# Patient Record
Sex: FEMALE | Race: BLACK OR AFRICAN AMERICAN | NOT HISPANIC OR LATINO | URBAN - METROPOLITAN AREA
[De-identification: names, ages, dates, MRNs, and addresses within clinical notes are randomized per-mention and may not be internally consistent; named-entity substitution may affect disease eponyms.]

---

## 2021-04-12 ENCOUNTER — OFFICE VISIT (OUTPATIENT)
Dept: URBAN - METROPOLITAN AREA CLINIC 40 | Facility: CLINIC | Age: 81
End: 2021-04-12

## 2021-04-12 RX ORDER — DEXLANSOPRAZOLE 60 MG/1
TAKE 1 CAPSULE BY ORAL ROUTE DAILY FOR 90 DAYS CAPSULE, DELAYED RELEASE ORAL 1
Qty: 90 | Refills: 0 | Status: ACTIVE | COMMUNITY
Start: 2017-12-07

## 2021-06-11 ENCOUNTER — OFFICE VISIT (OUTPATIENT)
Dept: URBAN - METROPOLITAN AREA CLINIC 40 | Facility: CLINIC | Age: 81
End: 2021-06-11
Payer: MEDICARE

## 2021-06-11 ENCOUNTER — LAB OUTSIDE AN ENCOUNTER (OUTPATIENT)
Dept: URBAN - METROPOLITAN AREA CLINIC 40 | Facility: CLINIC | Age: 81
End: 2021-06-11

## 2021-06-11 DIAGNOSIS — R07.89 CHEST PAIN, NON-CARDIAC: ICD-10-CM

## 2021-06-11 DIAGNOSIS — K57.52 DIVERTICULITIS OF BOTH SMALL AND LARGE INTESTINE: ICD-10-CM

## 2021-06-11 DIAGNOSIS — R14.0 BLOATING: ICD-10-CM

## 2021-06-11 DIAGNOSIS — K64.1 GRADE II HEMORRHOIDS: ICD-10-CM

## 2021-06-11 PROCEDURE — 99204 OFFICE O/P NEW MOD 45 MIN: CPT | Performed by: INTERNAL MEDICINE

## 2021-06-11 PROCEDURE — 99244 OFF/OP CNSLTJ NEW/EST MOD 40: CPT | Performed by: INTERNAL MEDICINE

## 2021-06-11 RX ORDER — IRON FUM,PS CMP/VIT C/NIACIN 125-40-3MG
2 CAPSULE ORAL ONCE A DAY
Qty: 60 | Refills: 5 | OUTPATIENT
Start: 2021-06-11

## 2021-06-11 RX ORDER — METOPROLOL TARTRATE 50 MG/1
1 TABLET WITH FOOD TABLET ORAL TWICE A DAY
Status: ACTIVE | COMMUNITY

## 2021-06-11 RX ORDER — DEXLANSOPRAZOLE 60 MG/1
TAKE 1 CAPSULE BY ORAL ROUTE DAILY FOR 90 DAYS CAPSULE, DELAYED RELEASE ORAL 1
Qty: 90 | Refills: 0 | Status: DISCONTINUED | COMMUNITY
Start: 2017-12-07

## 2021-06-11 NOTE — HPI-TODAY'S VISIT:
Pt is referred by Dr. Jhon Loredo. A copy of this note will be provided for review. EGD 2017, esophageal web dilation. Colonoscopy 2015 negative for polyps. Colonoscopy 2006 negative. Prior hemorrhoid banding and CRS consult. EXAM:  CH CT ABDOMEN/PELVIS WITH IV CONTRAST(CREATININE DRAW IF NEEDED)   CLINICAL INDICATION:  Nausea/vomiting   TECHNIQUE: Following IV administration of 85 mL of Omnipaque 350, CT scan of the abdomen and pelvis with multiplanar reformatted images generated from the data set. Dose reduction technique was utilized.   COMPARISON:  No comparison studies are available at this time.   FINDINGS:    Lower chest:  No evidence of abnormality.   Liver:  Mild intrahepatic biliary ductal dilatation. Several small subcentimeter low-density lesions are noted statistically likely to represent cysts. The liver is otherwise unremarkable.   Gallbladder:  Distended    Pancreas: The common bile duct in the head of the pancreas measures approximately 5 mm. The pancreas appears unremarkable.    Spleen:  Normal.   Adrenals:  Normal.   Kidneys:  Normal.    Gastrointestinal:  The stomach appears normal. Small bowel unremarkable. Extensive divergent particularly low cyst is noted throughout the colon. In the distal sigmoid colon the region of the rectosigmoid and the left side of the pelvis there is a  dilated and inflamed appearing diverticulum with surrounding edema and/or inflammation. No free air or abscess is seen. This is felt to represent a focal area of acute diverticulitis. The appendix is normal in appearance.   Pelvis:  Status post hysterectomy. Urinary bladder appears normal.   Vasculature:  Abdominal and pelvic vasculature appears normal.   Lymph nodes:  No enlarged lymph nodes.    Bones:  No destructive osseous lesion.   IMPRESSION: .         .   Appearance compatible with focal acute diverticulitis of the distal sigmoid colon in the left side of the pelvis. No free air or abscess noted.   Distended gallbladder.   Central biliary ductal dilatation.   Released By: SHELLY STEPHENS MD 5/11/2021 8:32 AM

## 2021-06-14 ENCOUNTER — TELEPHONE ENCOUNTER (OUTPATIENT)
Dept: URBAN - METROPOLITAN AREA CLINIC 92 | Facility: CLINIC | Age: 81
End: 2021-06-14

## 2021-07-19 ENCOUNTER — TELEPHONE ENCOUNTER (OUTPATIENT)
Dept: URBAN - METROPOLITAN AREA CLINIC 74 | Facility: CLINIC | Age: 81
End: 2021-07-19

## 2021-07-23 ENCOUNTER — CLAIMS CREATED FROM THE CLAIM WINDOW (OUTPATIENT)
Dept: URBAN - METROPOLITAN AREA CLINIC 4 | Facility: CLINIC | Age: 81
End: 2021-07-23
Payer: MEDICARE

## 2021-07-23 ENCOUNTER — OFFICE VISIT (OUTPATIENT)
Dept: URBAN - METROPOLITAN AREA SURGERY CENTER 30 | Facility: SURGERY CENTER | Age: 81
End: 2021-07-23
Payer: MEDICARE

## 2021-07-23 DIAGNOSIS — K29.40 CHRONIC ATROPHIC GASTRITIS WITHOUT BLEEDING: ICD-10-CM

## 2021-07-23 DIAGNOSIS — K31.89 GASTRIC FOVEOLAR HYPERPLASIA: ICD-10-CM

## 2021-07-23 DIAGNOSIS — K29.40 ATROPHIC GASTRITIS: ICD-10-CM

## 2021-07-23 DIAGNOSIS — D50.9 ANEMIA, IRON DEFICIENCY: ICD-10-CM

## 2021-07-23 DIAGNOSIS — Z87.19 H/O ACUTE PANCREATITIS: ICD-10-CM

## 2021-07-23 PROCEDURE — G8907 PT DOC NO EVENTS ON DISCHARG: HCPCS | Performed by: INTERNAL MEDICINE

## 2021-07-23 PROCEDURE — 88342 IMHCHEM/IMCYTCHM 1ST ANTB: CPT | Performed by: PATHOLOGY

## 2021-07-23 PROCEDURE — 45378 DIAGNOSTIC COLONOSCOPY: CPT | Performed by: INTERNAL MEDICINE

## 2021-07-23 PROCEDURE — 43239 EGD BIOPSY SINGLE/MULTIPLE: CPT | Performed by: INTERNAL MEDICINE

## 2021-07-23 PROCEDURE — 88305 TISSUE EXAM BY PATHOLOGIST: CPT | Performed by: PATHOLOGY

## 2021-08-02 ENCOUNTER — OFFICE VISIT (OUTPATIENT)
Dept: URBAN - METROPOLITAN AREA CLINIC 40 | Facility: CLINIC | Age: 81
End: 2021-08-02
Payer: MEDICARE

## 2021-08-02 DIAGNOSIS — K57.92 DIVERTICULITIS: ICD-10-CM

## 2021-08-02 DIAGNOSIS — K64.1 GRADE II HEMORRHOIDS: ICD-10-CM

## 2021-08-02 DIAGNOSIS — R10.84 GENERALIZED ABDOMINAL PAIN: ICD-10-CM

## 2021-08-02 DIAGNOSIS — R14.0 BLOATING: ICD-10-CM

## 2021-08-02 DIAGNOSIS — K21.9 GERD WITHOUT ESOPHAGITIS: ICD-10-CM

## 2021-08-02 PROBLEM — 282825002: Status: ACTIVE | Noted: 2021-06-11

## 2021-08-02 PROCEDURE — 99212 OFFICE O/P EST SF 10 MIN: CPT | Performed by: INTERNAL MEDICINE

## 2021-08-02 RX ORDER — METOPROLOL TARTRATE 50 MG/1
1 TABLET WITH FOOD TABLET ORAL TWICE A DAY
Status: ACTIVE | COMMUNITY

## 2021-08-02 RX ORDER — IRON FUM,PS CMP/VIT C/NIACIN 125-40-3MG
2 CAPSULE ORAL ONCE A DAY
Qty: 60 | Refills: 5 | OUTPATIENT

## 2021-08-02 RX ORDER — IRON FUM,PS CMP/VIT C/NIACIN 125-40-3MG
2 CAPSULE ORAL ONCE A DAY
Qty: 60 | Refills: 5 | Status: ACTIVE | COMMUNITY
Start: 2021-06-11

## 2021-08-02 NOTE — HPI-TODAY'S VISIT:
Patient Returns to clinic for follow up after procedures. She was seen recently for follow-up after diverticulitis and acid reflux.  At that time she had hemorrhoids bloating and noncardiac chest pain.  Also anemia.  Last visit we started Integra iron 2 capsules daily.  Imagings showed distended gallbladder there was mention of nonspecific bile duct dilation.  Mild normocytic anemia.  EGD showed small hiatal hernia.  Bx negative, SBBx negative, No H.pylori. Colonoscopy showed diverticulosis, no polyps or AVMs.

## 2022-08-01 ENCOUNTER — OFFICE VISIT (OUTPATIENT)
Dept: URBAN - METROPOLITAN AREA CLINIC 40 | Facility: CLINIC | Age: 82
End: 2022-08-01

## 2022-08-01 RX ORDER — METOPROLOL TARTRATE 50 MG/1
1 TABLET WITH FOOD TABLET ORAL TWICE A DAY
Status: ACTIVE | COMMUNITY

## 2022-08-01 RX ORDER — IRON FUM,PS CMP/VIT C/NIACIN 125-40-3MG
2 CAPSULE ORAL ONCE A DAY
Qty: 60 | Refills: 5 | Status: ACTIVE | COMMUNITY

## 2023-01-09 ENCOUNTER — TELEPHONE ENCOUNTER (OUTPATIENT)
Dept: URBAN - METROPOLITAN AREA CLINIC 92 | Facility: CLINIC | Age: 83
End: 2023-01-09

## 2023-01-09 ENCOUNTER — WEB ENCOUNTER (OUTPATIENT)
Dept: URBAN - METROPOLITAN AREA CLINIC 40 | Facility: CLINIC | Age: 83
End: 2023-01-09

## 2023-01-09 ENCOUNTER — OFFICE VISIT (OUTPATIENT)
Dept: URBAN - METROPOLITAN AREA CLINIC 40 | Facility: CLINIC | Age: 83
End: 2023-01-09
Payer: MEDICARE

## 2023-01-09 VITALS
TEMPERATURE: 96.1 F | HEART RATE: 63 BPM | HEIGHT: 63 IN | WEIGHT: 161.2 LBS | BODY MASS INDEX: 28.56 KG/M2 | DIASTOLIC BLOOD PRESSURE: 82 MMHG | SYSTOLIC BLOOD PRESSURE: 130 MMHG

## 2023-01-09 DIAGNOSIS — K62.5 RECTAL BLEEDING: ICD-10-CM

## 2023-01-09 DIAGNOSIS — Z86.2 HISTORY OF ANEMIA: ICD-10-CM

## 2023-01-09 DIAGNOSIS — R10.32 ABDOMINAL PAIN, LLQ: ICD-10-CM

## 2023-01-09 DIAGNOSIS — Z87.19 HISTORY OF DIVERTICULITIS OF COLON: ICD-10-CM

## 2023-01-09 PROBLEM — 102614006: Status: ACTIVE | Noted: 2021-06-14

## 2023-01-09 PROBLEM — 307496006: Status: ACTIVE | Noted: 2021-06-11

## 2023-01-09 PROCEDURE — 99214 OFFICE O/P EST MOD 30 MIN: CPT | Performed by: PHYSICIAN ASSISTANT

## 2023-01-09 RX ORDER — METOPROLOL TARTRATE 50 MG/1
1 TABLET WITH FOOD TABLET ORAL TWICE A DAY
Status: ACTIVE | COMMUNITY

## 2023-01-09 RX ORDER — IRON FUM,PS CMP/VIT C/NIACIN 125-40-3MG
2 CAPSULE ORAL ONCE A DAY
Qty: 60 | Refills: 5 | Status: ACTIVE | COMMUNITY

## 2023-01-09 NOTE — HPI-TODAY'S VISIT:
Ms. Gonzalez is an 82-year old Black female who presents to office with complaint of rectal bleeding, BRBPR for the last 24 hours. Last seen by Dr. Goss 8/2/21 for follow up after procedures. She was seen recently for follow-up after diverticulitis and acid reflux.  At that time she had hemorrhoids bloating and noncardiac chest pain.  Also anemia, previously on Integra iron, 2 capsules daily. Normal CBC 11/15/22.  Past imaging showed distended gallbladder there was mention of nonspecific bile duct dilation.  EGD in 2021 showed small hiatal hernia.  Bx negative, SBBx negative, No H.pylori. Colonoscopy showed diverticulosis, no polyps or AVMs. Today, she returns to the office stating that on 8 AM on yesterday, Sunday morning, she had a normal soft bowel movement which was red and was accompanied by a large amount of bright red blood per rectum. The volume of BRB has reportedly increased since yesterday AM. She is also having lower abdominal cramping bilaterally.  She admits however that the pain is more noticeable on the left lower quadrant.  Denies any fever or chills.  Denies any change in medications, herbs or supplements.  She has been on Eliquis long-term without any other complication.  She is a non-smoker and does not drink alcohol.  No use of NSAIDs.  May use Tylenol only as needed. She is no longer on iron supplement.

## 2023-01-09 NOTE — PHYSICAL EXAM RECTAL:
(Gisele ROSALES) :  , no masses palpable , no melena, +BRB at anal canal. ROMELIA otherwise normal. Grade 3 IH at suspected LL position, nonbleeding with palpation. Erythematous anal canal. No evidence of anal fissure. Sphincter tone maintained.

## 2023-01-09 NOTE — PHYSICAL EXAM GASTROINTESTINAL
Abdomen , soft, mildly TTP at lower abdomen, nondistended , no guarding or rigidity , no masses palpable , normal bowel sounds , Liver and Spleen,  no hepatosplenomegaly , liver nontender

## 2023-01-11 ENCOUNTER — TELEPHONE ENCOUNTER (OUTPATIENT)
Dept: URBAN - METROPOLITAN AREA CLINIC 40 | Facility: CLINIC | Age: 83
End: 2023-01-11

## 2023-02-03 ENCOUNTER — OFFICE VISIT (OUTPATIENT)
Dept: URBAN - METROPOLITAN AREA CLINIC 40 | Facility: CLINIC | Age: 83
End: 2023-02-03
Payer: MEDICARE

## 2023-02-03 ENCOUNTER — WEB ENCOUNTER (OUTPATIENT)
Dept: URBAN - METROPOLITAN AREA CLINIC 40 | Facility: CLINIC | Age: 83
End: 2023-02-03

## 2023-02-03 ENCOUNTER — DASHBOARD ENCOUNTERS (OUTPATIENT)
Age: 83
End: 2023-02-03

## 2023-02-03 VITALS
SYSTOLIC BLOOD PRESSURE: 140 MMHG | HEART RATE: 78 BPM | BODY MASS INDEX: 28.95 KG/M2 | DIASTOLIC BLOOD PRESSURE: 80 MMHG | TEMPERATURE: 97.6 F | HEIGHT: 63 IN | WEIGHT: 163.4 LBS

## 2023-02-03 DIAGNOSIS — K21.9 GERD WITHOUT ESOPHAGITIS: ICD-10-CM

## 2023-02-03 DIAGNOSIS — Z87.19 HISTORY OF DIVERTICULITIS OF COLON: ICD-10-CM

## 2023-02-03 DIAGNOSIS — Z79.01 BLOOD THINNED DUE TO LONG-TERM ANTICOAGULANT USE: ICD-10-CM

## 2023-02-03 DIAGNOSIS — Z86.2 HISTORY OF ANEMIA: ICD-10-CM

## 2023-02-03 DIAGNOSIS — R10.30 LOWER ABDOMINAL PAIN: ICD-10-CM

## 2023-02-03 DIAGNOSIS — K64.1 GRADE II HEMORRHOIDS: ICD-10-CM

## 2023-02-03 DIAGNOSIS — R14.0 BLOATING: ICD-10-CM

## 2023-02-03 DIAGNOSIS — I48.20 CHRONIC ATRIAL FIBRILLATION: ICD-10-CM

## 2023-02-03 DIAGNOSIS — K62.5 RECTAL BLEEDING: ICD-10-CM

## 2023-02-03 PROBLEM — 266435005: Status: ACTIVE | Noted: 2021-08-02

## 2023-02-03 PROBLEM — 116289008: Status: ACTIVE | Noted: 2021-06-14

## 2023-02-03 PROBLEM — 275538002 HISTORY OF ANEMIA: Status: ACTIVE | Noted: 2023-01-09

## 2023-02-03 PROBLEM — 426749004: Status: ACTIVE | Noted: 2023-01-09

## 2023-02-03 PROBLEM — 711150003 LONG-TERM CURRENT USE OF ANTICOAGULANT: Status: ACTIVE | Noted: 2023-01-09

## 2023-02-03 PROCEDURE — 99213 OFFICE O/P EST LOW 20 MIN: CPT | Performed by: INTERNAL MEDICINE

## 2023-02-03 RX ORDER — IRON FUM,PS CMP/VIT C/NIACIN 125-40-3MG
2 CAPSULE ORAL ONCE A DAY
Qty: 60 | Refills: 5 | Status: ON HOLD | COMMUNITY

## 2023-02-03 RX ORDER — METOPROLOL TARTRATE 50 MG/1
1 TABLET WITH FOOD TABLET ORAL TWICE A DAY
Status: ACTIVE | COMMUNITY

## 2023-02-03 NOTE — HPI-TODAY'S VISIT:
Ms. Gonzalez is an 83-year old Black female who presented to office  a few weels ag with complaint of rectal bleeding, BRBPR for the last 24 hours. She saw [facility], negative ROMELIA. CTA was negative. Blood thinned. Hemorrhoids and diverticulosis.  -=-=- Prior note: Last seen by Dr. Goss 8/2/21 for follow up after procedures. She was seen recently for follow-up after diverticulitis and acid reflux.  At that time she had hemorrhoids bloating and noncardiac chest pain.  Also anemia, previously on Integra iron, 2 capsules daily. Normal CBC 11/15/22.  Past imaging showed distended gallbladder there was mention of nonspecific bile duct dilation.  EGD in 2021 showed small hiatal hernia.  Bx negative, SBBx negative, No H.pylori. Colonoscopy showed diverticulosis, no polyps or AVMs. Today, she returns to the office stating that on 8 AM on yesterday, Sunday morning, she had a normal soft bowel movement which was red and was accompanied by a large amount of bright red blood per rectum. The volume of BRB has reportedly increased since yesterday AM. She is also having lower abdominal cramping bilaterally.  She admits however that the pain is more noticeable on the left lower quadrant.  Denies any fever or chills.  Denies any change in medications, herbs or supplements.  She has been on Eliquis long-term without any other complication.  She is a non-smoker and does not drink alcohol.  No use of NSAIDs.  May use Tylenol only as needed. She is no longer on iron supplement. ===== ADDENDUM:   Postprocessing of the source data set was carried out in the [facility] laboratory utilizing slab MIP, MPR, and 3-D volume rendered imaging.    Agree with original report. No additional acute vascular findings. Accessory left renal artery.   Released By: JOSEPH CABRERA MD 1/12/2023 9:49 AM Addended by Joseph Cabrera MD on 1/12/2023  9:49 AM  Study Result  Narrative & Impression EXAM:   CT ANGIOGRAM ABDOMEN/PELVIS WITH IV CONTRAST(CREATININE DRAW IF NEEDED)   CLINICAL INDICATION:  K62.5 (Hemorrhage of anus and rectum)  Z87.19 (Personal history of other diseases of the digestive system)  Z79.01 (Long term (current) use of anticoagulants)  Z86.2 (Personal history of diseases of the blood and blood-forming organs and certain disorders involving the immune mechanism)  R10.30 (Lower abdominal pain, unspecified)    TECHNIQUE:  Following IV administration of 88 mL of Omnipaque, CT scan of the abdomen and pelvis with multiplanar reformatted images including MIPs generated from the data set. Dose reduction techniques were utilized. [facility] post processing is pending.   COMPARISON:  5/11/2021   Lower chest:  Right posterior Bochdalek hernia. No acute cardiopulmonary findings.   Liver:  Small hypodense lesions in the liver, favor to represent cysts.   Gallbladder:  Normal.    Pancreas: Normal.    Spleen:  Normal.   Adrenals:  Normal.   Kidneys:  Normal.    Gastrointestinal: Extensive pancolonic diverticulosis. No area of focal inflammation is identified on this exam. There is diverticular disease involving the terminal ileum. No bowel obstruction. The appendix is not identified.   Pelvis:  Uterus is not identified. Bladder is grossly unremarkable. No adnexal mass. Laxity of the linea alba.   Lymph nodes:  No enlarged lymph nodes.    Bones:  No destructive osseous lesion. Degenerative spondylosis.   VASCULAR:   Suboptimal evaluation due to single arterial phase examination. There is no evidence of arterial extravasation on this exam.   Arterial structures enhance normally, without evidence of aneurysm or dissection. No hemodynamically significant stenosis.   IMPRESSION: .         .   No evidence for active bleeding on this single arterial phase examination. If clinical symptoms persist, endoscopic evaluation, nuclear medicine GI study, or CTA GI Bleed study is recommended.   Extensive diverticular disease without focal inflammation.   Released By: JOSEPH CABRERA 1/9/2023 2:56 PM

## 2023-02-03 NOTE — PHYSICAL EXAM HENT:
Head, normocephalic, atraumatic, Face, Face within normal limits, Ears, External ears within normal limits, Nose/Nasopharynx, External nose normal appearance, nares patent, no nasal discharge, Mouth and Throat, Oral cavity appearance normal, Lips, Appearance normal , Head, normocephalic, atraumatic, Face, Face within normal limits, Ears, External ears within normal limits

## 2023-02-03 NOTE — PHYSICAL EXAM CHEST:
no lesions, no deformities, no traumatic injuries, no significant scars are present, chest wall non-tender, no masses present, breathing is unlabored without accessory muscle use,normal breath sounds , chest wall non-tender, breathing is unlabored without accessory muscle use, normal breath sounds

## 2023-02-03 NOTE — PHYSICAL EXAM EYES:
Conjunctivae and eyelids appear normal,  Sclerae : White without injection  , Conjuntivae and eyelids appear normal, Sclerae : White without injection

## 2024-10-04 ENCOUNTER — TELEPHONE ENCOUNTER (OUTPATIENT)
Dept: URBAN - METROPOLITAN AREA CLINIC 40 | Facility: CLINIC | Age: 84
End: 2024-10-04

## 2024-10-16 ENCOUNTER — OFFICE VISIT (OUTPATIENT)
Dept: URBAN - METROPOLITAN AREA CLINIC 40 | Facility: CLINIC | Age: 84
End: 2024-10-16
Payer: MEDICARE

## 2024-10-16 VITALS
BODY MASS INDEX: 28 KG/M2 | HEIGHT: 63 IN | HEART RATE: 70 BPM | WEIGHT: 158 LBS | TEMPERATURE: 97.7 F | SYSTOLIC BLOOD PRESSURE: 110 MMHG | DIASTOLIC BLOOD PRESSURE: 64 MMHG

## 2024-10-16 DIAGNOSIS — K64.1 GRADE II HEMORRHOIDS: ICD-10-CM

## 2024-10-16 DIAGNOSIS — K21.9 GERD WITHOUT ESOPHAGITIS: ICD-10-CM

## 2024-10-16 DIAGNOSIS — R14.0 BLOATING: ICD-10-CM

## 2024-10-16 DIAGNOSIS — R10.30 LOWER ABDOMINAL PAIN: ICD-10-CM

## 2024-10-16 DIAGNOSIS — K62.5 RECTAL BLEEDING: ICD-10-CM

## 2024-10-16 DIAGNOSIS — I48.20 CHRONIC ATRIAL FIBRILLATION: ICD-10-CM

## 2024-10-16 DIAGNOSIS — Z86.2 HISTORY OF ANEMIA: ICD-10-CM

## 2024-10-16 DIAGNOSIS — Z79.01 BLOOD THINNED DUE TO LONG-TERM ANTICOAGULANT USE: ICD-10-CM

## 2024-10-16 DIAGNOSIS — Z87.19 HISTORY OF DIVERTICULITIS OF COLON: ICD-10-CM

## 2024-10-16 PROCEDURE — 99213 OFFICE O/P EST LOW 20 MIN: CPT | Performed by: PHYSICIAN ASSISTANT

## 2024-10-16 RX ORDER — METOPROLOL TARTRATE 50 MG/1
1 TABLET WITH FOOD TABLET ORAL TWICE A DAY
Status: ACTIVE | COMMUNITY

## 2024-10-16 NOTE — HPI-TODAY'S VISIT:
Ms. Gonzalez is an 82-year old Black female who presents to office with complaint of rectal bleeding, BRBPR for the last 24 hours. Last seen by Dr. Goss 8/2/21 for follow up after procedures. She was seen recently for follow-up after diverticulitis and acid reflux.  At that time she had hemorrhoids bloating and noncardiac chest pain.  Also anemia, previously on Integra iron, 2 capsules daily. Normal CBC 11/15/22.  Past imaging showed distended gallbladder there was mention of nonspecific bile duct dilation.  EGD in 2021 showed small hiatal hernia.  Bx negative, SBBx negative, No H.pylori. Colonoscopy showed IHs, diverticulosis, no polyps or AVMs.   She has been on Eliquis long-term without any other complication.  She is a non-smoker and does not drink alcohol.  No use of NSAIDs.  May use Tylenol only as needed. She is no longer on iron supplement.  Patient admitted at [facility] October 5, 2024 with syncopal episode at home.  It was deemed patient was dehydrated and has followed up with Dr. Stanford of cardiovascular medicine for event monitoring.  It appears echo has also been ordered.  No current chest pain, shortness of breath or dyspnea.  Denies dysphagia, nausea vomiting, tolerating diet.  No current rectal bleeding but did have rectal bleeding prior to her syncopal episode.  While admitted, her hemoglobin hematocrit were within normal range without any significant decrease or evidence of anemia.  Since discharge, patient has been using Preparation H as needed which seems to help.  She continues to take her Eliquis daily, advised to notify Dr. Stanford if recurrent rectal bleeding.  No complaints today.  Doing well from a gastrointestinal standpoint.

## 2025-01-16 ENCOUNTER — OFFICE VISIT (OUTPATIENT)
Dept: URBAN - METROPOLITAN AREA CLINIC 40 | Facility: CLINIC | Age: 85
End: 2025-01-16